# Patient Record
Sex: FEMALE | Race: WHITE | Employment: FULL TIME | URBAN - METROPOLITAN AREA
[De-identification: names, ages, dates, MRNs, and addresses within clinical notes are randomized per-mention and may not be internally consistent; named-entity substitution may affect disease eponyms.]

---

## 2017-05-16 ENCOUNTER — HOSPITAL ENCOUNTER (OUTPATIENT)
Age: 42
Setting detail: SPECIMEN
Discharge: HOME | End: 2017-05-16
Payer: COMMERCIAL

## 2017-05-16 ENCOUNTER — OFFICE VISIT (OUTPATIENT)
Dept: OBGYN CLINIC | Age: 42
End: 2017-05-16
Payer: COMMERCIAL

## 2017-05-16 VITALS
HEART RATE: 84 BPM | SYSTOLIC BLOOD PRESSURE: 118 MMHG | RESPIRATION RATE: 18 BRPM | HEIGHT: 64 IN | DIASTOLIC BLOOD PRESSURE: 68 MMHG | BODY MASS INDEX: 25.27 KG/M2 | WEIGHT: 148 LBS

## 2017-05-16 DIAGNOSIS — Z01.419 PAP SMEAR, AS PART OF ROUTINE GYNECOLOGICAL EXAMINATION: Primary | ICD-10-CM

## 2017-05-16 DIAGNOSIS — Z11.51 SPECIAL SCREENING EXAMINATION FOR HUMAN PAPILLOMAVIRUS (HPV): ICD-10-CM

## 2017-05-16 PROCEDURE — 87624 HPV HI-RISK TYP POOLED RSLT: CPT

## 2017-05-16 PROCEDURE — 88175 CYTOPATH C/V AUTO FLUID REDO: CPT

## 2017-05-16 PROCEDURE — 99395 PREV VISIT EST AGE 18-39: CPT | Performed by: NURSE PRACTITIONER

## 2017-05-16 RX ORDER — FLUCONAZOLE 150 MG/1
150 TABLET ORAL ONCE
Qty: 2 TABLET | Refills: 2 | Status: SHIPPED | OUTPATIENT
Start: 2017-05-16 | End: 2017-05-16

## 2017-05-16 ASSESSMENT — PATIENT HEALTH QUESTIONNAIRE - PHQ9
2. FEELING DOWN, DEPRESSED OR HOPELESS: 0
SUM OF ALL RESPONSES TO PHQ QUESTIONS 1-9: 0
SUM OF ALL RESPONSES TO PHQ9 QUESTIONS 1 & 2: 0
1. LITTLE INTEREST OR PLEASURE IN DOING THINGS: 0

## 2017-05-17 LAB
HPV SAMPLE: NORMAL
HPV SOURCE: NORMAL
HPV, GENOTYPE 16: NOT DETECTED
HPV, GENOTYPE 18: NOT DETECTED
HPV, HIGH RISK OTHER: NOT DETECTED
HPV, INTERPRETATION: NORMAL

## 2017-05-18 LAB — CYTOLOGY REPORT: NORMAL

## 2019-10-02 ENCOUNTER — HOSPITAL ENCOUNTER (OUTPATIENT)
Age: 44
Setting detail: SPECIMEN
Discharge: HOME | End: 2019-10-02
Payer: COMMERCIAL

## 2019-10-02 ENCOUNTER — OFFICE VISIT (OUTPATIENT)
Dept: OBGYN CLINIC | Age: 44
End: 2019-10-02
Payer: COMMERCIAL

## 2019-10-02 VITALS
HEART RATE: 80 BPM | DIASTOLIC BLOOD PRESSURE: 60 MMHG | BODY MASS INDEX: 22.2 KG/M2 | HEIGHT: 64 IN | WEIGHT: 130 LBS | SYSTOLIC BLOOD PRESSURE: 110 MMHG

## 2019-10-02 DIAGNOSIS — Z83.3 FAMILY HISTORY OF DIABETES MELLITUS: ICD-10-CM

## 2019-10-02 DIAGNOSIS — Z01.419 VISIT FOR GYNECOLOGIC EXAMINATION: ICD-10-CM

## 2019-10-02 DIAGNOSIS — Z13.820 SCREENING FOR OSTEOPOROSIS: ICD-10-CM

## 2019-10-02 DIAGNOSIS — N76.0 ACUTE VAGINITIS: ICD-10-CM

## 2019-10-02 DIAGNOSIS — Z12.39 BREAST CANCER SCREENING: ICD-10-CM

## 2019-10-02 DIAGNOSIS — Z01.419 VISIT FOR GYNECOLOGIC EXAMINATION: Primary | ICD-10-CM

## 2019-10-02 DIAGNOSIS — Z11.3 SCREENING EXAMINATION FOR STD (SEXUALLY TRANSMITTED DISEASE): ICD-10-CM

## 2019-10-02 DIAGNOSIS — Z11.51 SCREENING FOR HPV (HUMAN PAPILLOMAVIRUS): ICD-10-CM

## 2019-10-02 LAB
DIRECT EXAM: ABNORMAL
HEPATITIS C ANTIBODY: NONREACTIVE
HIV AG/AB: NONREACTIVE
Lab: ABNORMAL
SPECIMEN DESCRIPTION: ABNORMAL
T. PALLIDUM, IGG: NONREACTIVE

## 2019-10-02 PROCEDURE — 86803 HEPATITIS C AB TEST: CPT

## 2019-10-02 PROCEDURE — 87591 N.GONORRHOEAE DNA AMP PROB: CPT

## 2019-10-02 PROCEDURE — 87389 HIV-1 AG W/HIV-1&-2 AB AG IA: CPT

## 2019-10-02 PROCEDURE — 87480 CANDIDA DNA DIR PROBE: CPT

## 2019-10-02 PROCEDURE — 36415 COLL VENOUS BLD VENIPUNCTURE: CPT

## 2019-10-02 PROCEDURE — 83036 HEMOGLOBIN GLYCOSYLATED A1C: CPT

## 2019-10-02 PROCEDURE — 87510 GARDNER VAG DNA DIR PROBE: CPT

## 2019-10-02 PROCEDURE — 86780 TREPONEMA PALLIDUM: CPT

## 2019-10-02 PROCEDURE — 99396 PREV VISIT EST AGE 40-64: CPT | Performed by: NURSE PRACTITIONER

## 2019-10-02 PROCEDURE — 87491 CHLMYD TRACH DNA AMP PROBE: CPT

## 2019-10-02 PROCEDURE — 87660 TRICHOMONAS VAGIN DIR PROBE: CPT

## 2019-10-02 RX ORDER — FLUCONAZOLE 150 MG/1
150 TABLET ORAL ONCE
Qty: 2 TABLET | Refills: 2 | Status: SHIPPED | OUTPATIENT
Start: 2019-10-02 | End: 2019-10-02

## 2019-10-02 ASSESSMENT — PATIENT HEALTH QUESTIONNAIRE - PHQ9
SUM OF ALL RESPONSES TO PHQ9 QUESTIONS 1 & 2: 0
SUM OF ALL RESPONSES TO PHQ QUESTIONS 1-9: 0
1. LITTLE INTEREST OR PLEASURE IN DOING THINGS: 0
SUM OF ALL RESPONSES TO PHQ QUESTIONS 1-9: 0
2. FEELING DOWN, DEPRESSED OR HOPELESS: 0

## 2019-10-03 ENCOUNTER — TELEPHONE (OUTPATIENT)
Dept: OBGYN CLINIC | Age: 44
End: 2019-10-03

## 2019-10-03 LAB
C TRACH DNA GENITAL QL NAA+PROBE: NEGATIVE
ESTIMATED AVERAGE GLUCOSE: 117 MG/DL
HBA1C MFR BLD: 5.7 % (ref 4–6)
N. GONORRHOEAE DNA: NEGATIVE
SPECIMEN DESCRIPTION: NORMAL

## 2019-10-03 RX ORDER — METRONIDAZOLE 500 MG/1
500 TABLET ORAL 2 TIMES DAILY
Qty: 14 TABLET | Refills: 0 | Status: SHIPPED | OUTPATIENT
Start: 2019-10-03 | End: 2019-10-10

## 2020-03-11 ENCOUNTER — HOSPITAL ENCOUNTER (OUTPATIENT)
Age: 45
Setting detail: SPECIMEN
Discharge: HOME | End: 2020-03-11
Payer: COMMERCIAL

## 2020-03-11 ENCOUNTER — OFFICE VISIT (OUTPATIENT)
Dept: OBGYN CLINIC | Age: 45
End: 2020-03-11
Payer: COMMERCIAL

## 2020-03-11 VITALS
WEIGHT: 128 LBS | HEIGHT: 64 IN | SYSTOLIC BLOOD PRESSURE: 138 MMHG | DIASTOLIC BLOOD PRESSURE: 82 MMHG | BODY MASS INDEX: 21.85 KG/M2 | HEART RATE: 76 BPM

## 2020-03-11 LAB
DIRECT EXAM: ABNORMAL
Lab: ABNORMAL
SPECIMEN DESCRIPTION: ABNORMAL

## 2020-03-11 PROCEDURE — 87491 CHLMYD TRACH DNA AMP PROBE: CPT

## 2020-03-11 PROCEDURE — 87510 GARDNER VAG DNA DIR PROBE: CPT

## 2020-03-11 PROCEDURE — 87660 TRICHOMONAS VAGIN DIR PROBE: CPT

## 2020-03-11 PROCEDURE — 87591 N.GONORRHOEAE DNA AMP PROB: CPT

## 2020-03-11 PROCEDURE — 99213 OFFICE O/P EST LOW 20 MIN: CPT | Performed by: NURSE PRACTITIONER

## 2020-03-11 PROCEDURE — 87480 CANDIDA DNA DIR PROBE: CPT

## 2020-03-11 NOTE — PROGRESS NOTES
 Stroke Neg Hx        Social History     Socioeconomic History    Marital status:      Spouse name: Not on file    Number of children: Not on file    Years of education: Not on file    Highest education level: Not on file   Occupational History    Not on file   Social Needs    Financial resource strain: Not on file    Food insecurity     Worry: Not on file     Inability: Not on file    Transportation needs     Medical: Not on file     Non-medical: Not on file   Tobacco Use    Smoking status: Current Every Day Smoker     Packs/day: 0.25     Years: 20.00     Pack years: 5.00     Types: Cigarettes     Last attempt to quit: 10/20/2015     Years since quittin.3    Smokeless tobacco: Never Used   Substance and Sexual Activity    Alcohol use: No     Alcohol/week: 0.0 standard drinks    Drug use: No    Sexual activity: Yes     Partners: Male     Birth control/protection: Surgical     Comment: hyst   Lifestyle    Physical activity     Days per week: Not on file     Minutes per session: Not on file    Stress: Not on file   Relationships    Social connections     Talks on phone: Not on file     Gets together: Not on file     Attends Anabaptism service: Not on file     Active member of club or organization: Not on file     Attends meetings of clubs or organizations: Not on file     Relationship status: Not on file    Intimate partner violence     Fear of current or ex partner: Not on file     Emotionally abused: Not on file     Physically abused: Not on file     Forced sexual activity: Not on file   Other Topics Concern    Not on file   Social History Narrative    Not on file         MEDICATIONS:  Current Outpatient Medications   Medication Sig Dispense Refill    Probiotic Product (PROBIOTIC-10 PO) Take by mouth       No current facility-administered medications for this visit.               ALLERGIES:  Allergies as of 2020 - Review Complete 2020   Allergen Reaction Noted    Sukhi [cefaclor] Hives 08/29/2011         REVIEW OF SYSTEMS:    yes   A minimum of an eleven point review of systems was completed. Review Of Systems (11 point):  Constitutional: No fever, chills or malaise; No weight change or fatigue  Head and Eyes: No vision, Headache, Dizziness or trauma in last 12 months  ENT ROS: No hearing, Tinnitis, sinus or taste problems  Hematological and Lymphatic ROS:No Lymphoma, Von Willebrand's, Hemophillia or Bleeding History  Psych ROS: No Depression, Homicidal thoughts,suicidal thoughts, or anxiety  Breast ROS: No prior breast abnormalities or lumps  Respiratory ROS: No SOB, Pneumoniae,Cough, or Pulmonary Embolism History  Cardiovascular ROS: No Chest Pain with Exertion, Palpitations, Syncope, Edema, Arrhythmia  Gastrointestinal ROS: No Indigestion, Heartburn, Nausea, vomiting, Diarrhea, Constipation,or Bowel Changes; No Bloody Stools or melena  Genito-Urinary ROS: No Dysuria, Hematuria or Nocturia. No Urinary Incontinence or Vaginal Discharge. + painful lump to right side of pelvic area. Musculoskeletal ROS: No Arthralgia, Arthritis,Gout,Osteoporosis or Rheumatism  Neurological ROS: No CVA, Migraines, Epilepsy, Seizure Hx, or Limb Weakness  Dermatological ROS: No Rash, Itching, Hives, Mole Changes or Cancer          Blood pressure 138/82, pulse 76, height 5' 4\" (1.626 m), weight 128 lb (58.1 kg), not currently breastfeeding. Abdomen: Soft non-tender; good bowel sounds. No guarding, rebound or rigidity. No CVA tenderness bilaterally. Extremities: No calf tenderness, DTR 2/4, and No edema bilaterally    Pelvic: Vulva and vagina appear normal. Bimanual exam reveals normal cuff intact. 3- 4 inch soft mass palpated while patient standing up beside table- mobile, slightly tender with palpation. Located on right side of pelvic area. Diagnostics:  No results found.     Lab Results:  Results for orders placed or performed during the hospital encounter of 10/02/19   C.trachomatis N.gonorrhoeae DNA   Result Value Ref Range    Specimen Description . CERVIX     C. trachomatis DNA NEGATIVE NEGATIVE    N. gonorrhoeae DNA NEGATIVE NEGATIVE   VAGINITIS DNA PROBE   Result Value Ref Range    Specimen Description . VAGINAL SWAB     Special Requests NOT REPORTED     Direct Exam POSITIVE for Trichomonas vaginalis (A)     Direct Exam POSITIVE for Gardnerella vaginalis. (A)     Direct Exam NEGATIVE for Candida sp. Direct Exam       Method of testing is a DNA probe intended for detection and identification of Candida species, Gardnerella vaginalis, and Trichomonas vaginalis nucleic acid in vaginal fluid specimens from patients with symptoms of vaginitis/vaginosis. Hepatitis C Antibody   Result Value Ref Range    Hepatitis C Ab NONREACTIVE NONREACTIVE   T. pallidum Ab   Result Value Ref Range    T. pallidum, IgG NONREACTIVE NONREACTIVE   HIV Screen   Result Value Ref Range    HIV Ag/Ab NONREACTIVE NONREACTIVE   Hemoglobin A1C   Result Value Ref Range    Hemoglobin A1C 5.7 4.0 - 6.0 %    Estimated Avg Glucose 117 mg/dL         Assessment:   Diagnosis Orders   1. Pelvic pain  US Pelvis Complete    US Non OB Transvaginal    VAGINITIS DNA PROBE    C.trachomatis N.gonorrhoeae DNA   2. Pelvic mass  US Pelvis Complete    US Non OB Transvaginal     Patient Active Problem List    Diagnosis Date Noted    Exploratory Laparotomy Right Adnexectomy Extensive Lysis of adhsions 10/27/2015     Priority: High     Initial Diagnostic Laparoscopy 10/27/2015.  Conversion to Open X-Lap KYLE RSO        History of endometriosis      Priority: High    History of total abdominal hysterectomy 1990's Severe Dysplasia      Priority: High     BS, LO, right ovarian preservation  ASCCP Recommendations Reviewed Paps Continue Q 5 yrs.    4/28/2015 PAP WNL   -HPV      History of cervical dysplasia      Priority: Medium     1990's  HX Of conization of cervix    Paps Q 1yr      Smoker 08/29/2011     Priority: Medium     The patient was instructed to stop smoking. Morbidity, mortality, and cessation programs were reviewed. Worsening of long and short term medical health risks were discussed with the addition of tobacco. Secondary smoke risks were reviewed with respect to children and newborns. Increases in Sudden Infant Death Syndrome, asthma, respiratory problems, and cancers were reviewed.  S/P laparoscopy 08/29/2011     Priority: Medium     Diagnostic laparoscopy 02/12/08  Hx of endometriosis      Vision abnormalities      Priority: Low     glasses      Colitis      Priority: Low           PLAN:  Return in about 3 weeks (around 4/1/2020) for DR Rivera/ pelvic pain/ mass. Pelvic ultrasound ordered. Vaginal cultures collected. Follow up with physician to discuss results/further evaluation. Repeat Annual every 1 year  Cervical Cytology Evaluation begins at 24years old. If Negative Cytology, Follow-up screening per current guidelines. Return to the office in 3 weeks. Counseled on preventative health maintenance follow-up. Orders Placed This Encounter   Procedures    VAGINITIS DNA PROBE     Standing Status:   Future     Standing Expiration Date:   3/11/2021    C.trachomatis N.gonorrhoeae DNA     Standing Status:   Future     Standing Expiration Date:   4/11/2020     Order Specific Question:   Source: Answer:   Vaginal    US Pelvis Complete     Standing Status:   Future     Standing Expiration Date:   6/11/2020     Order Specific Question:   Reason for exam:     Answer:   pelvic pain, pelvic mass    US Non OB Transvaginal     Standing Status:   Future     Standing Expiration Date:   9/11/2020     Order Specific Question:   Reason for exam:     Answer:   pelvic pain, pelvic mass       Patient was seen with total face to face time of 15 minutes. More than 50% of this visit was counseling and education regarding The primary encounter diagnosis was Pelvic pain. A diagnosis of Pelvic mass was also pertinent to this visit.  and

## 2020-03-12 ENCOUNTER — TELEPHONE (OUTPATIENT)
Dept: OBGYN CLINIC | Age: 45
End: 2020-03-12

## 2020-03-12 LAB
C TRACH DNA GENITAL QL NAA+PROBE: NEGATIVE
N. GONORRHOEAE DNA: NEGATIVE
SPECIMEN DESCRIPTION: NORMAL

## 2020-03-12 RX ORDER — METRONIDAZOLE 500 MG/1
500 TABLET ORAL 2 TIMES DAILY
Qty: 14 TABLET | Refills: 0 | Status: SHIPPED | OUTPATIENT
Start: 2020-03-12 | End: 2020-03-19

## 2020-03-12 NOTE — TELEPHONE ENCOUNTER
Per Elma Barrera CNP, patient made aware of +BV with order for flagyl. Patient voiced an understanding of results and treatment.

## 2020-03-12 NOTE — TELEPHONE ENCOUNTER
----- Message from BETTY Zambrano - CNP sent at 3/11/2020  6:14 PM EDT -----  +BV- flagyl 500mg PO BID x7 days

## 2020-04-01 ENCOUNTER — OFFICE VISIT (OUTPATIENT)
Dept: OBGYN CLINIC | Age: 45
End: 2020-04-01
Payer: COMMERCIAL

## 2020-04-01 VITALS
HEIGHT: 64 IN | SYSTOLIC BLOOD PRESSURE: 100 MMHG | HEART RATE: 88 BPM | BODY MASS INDEX: 22.2 KG/M2 | WEIGHT: 130 LBS | DIASTOLIC BLOOD PRESSURE: 70 MMHG

## 2020-04-01 PROCEDURE — 76856 US EXAM PELVIC COMPLETE: CPT | Performed by: OBSTETRICS & GYNECOLOGY

## 2020-04-01 PROCEDURE — 76830 TRANSVAGINAL US NON-OB: CPT | Performed by: OBSTETRICS & GYNECOLOGY

## 2020-04-01 PROCEDURE — 99213 OFFICE O/P EST LOW 20 MIN: CPT | Performed by: OBSTETRICS & GYNECOLOGY

## 2020-04-01 NOTE — PROGRESS NOTES
Shaina Moore  2020      Shaina Moore is a 45 y.o. female       The patient was seen today. She was here to follow-up regarding her labs and diagnostics ordered at her last visit for the diagnosis of:    ICD-10-CM    1. Ventral hernia without obstruction or gangrene K43.9    2. Encounter for screening mammogram for malignant neoplasm of breast Z12.31 ROHAN DIGITAL SCREEN W CAD BILATERAL   3. Possible Incisional hernia component, without obstruction or gangrene K43.2        Her bowels are regular and she is voiding without difficulty. Pt state she noticed the lump 2017. She states she did nothing with it and it progressively got bigger. She would like it repaired. 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 +Smoker. Instructions on cessation reviewed. Past Medical History:   Diagnosis Date    Asthma     as child.  Colitis     History of cervical dysplasia     History of endometriosis     History of total abdominal hysterectomy     BS, LO, right ovarian preservation    Vision abnormalities     glasses         Past Surgical History:   Procedure Laterality Date    BELPHAROPTOSIS REPAIR Left     had exploratory surger to lt. eye lid . [facility]    conization, cryo    CHOLECYSTECTOMY, LAPAROSCOPIC      DILATION AND CURETTAGE  2008    HYSTERECTOMY      right ovary intact    HYSTEROSCOPY      LAPAROSCOPY  2008    x2    LAPAROSCOPY  10/27/15    DIAG.  LAP., EXPLORATORY LAP WITH EXTENSIVE LYSIS OF ADHESIONS , RIGHT ADNEXECTOMY         Family History   Problem Relation Age of Onset    Hypertension Paternal Grandfather     Diabetes Paternal Grandmother     Hypertension Paternal Grandmother     Hypertension Sister     Breast Cancer Neg Hx     Cancer Neg Hx     Colon Cancer Neg Hx     Eclampsia Neg Hx     Ovarian Cancer Neg Hx      Labor Neg Hx     Spont Abortions Neg Hx     Stroke Neg

## 2020-05-28 ENCOUNTER — HOSPITAL ENCOUNTER (OUTPATIENT)
Dept: WOMENS IMAGING | Age: 45
Discharge: HOME | End: 2020-05-30
Payer: COMMERCIAL

## 2020-05-28 ENCOUNTER — TELEPHONE (OUTPATIENT)
Dept: OBGYN CLINIC | Age: 45
End: 2020-05-28

## 2020-05-28 PROCEDURE — 77063 BREAST TOMOSYNTHESIS BI: CPT
